# Patient Record
Sex: MALE | Race: WHITE | NOT HISPANIC OR LATINO | Employment: FULL TIME | URBAN - METROPOLITAN AREA
[De-identification: names, ages, dates, MRNs, and addresses within clinical notes are randomized per-mention and may not be internally consistent; named-entity substitution may affect disease eponyms.]

---

## 2018-09-25 ENCOUNTER — TELEPHONE (OUTPATIENT)
Dept: NEUROLOGY | Facility: CLINIC | Age: 48
End: 2018-09-25

## 2018-09-25 NOTE — TELEPHONE ENCOUNTER
----- Message from Romelia Curtis sent at 9/25/2018 10:30 AM CDT -----  Contact: pt   Pt call back to update his info and want to know why his appt was cancel. Pt would like to be worked back in for his appt time. Please call pt at [phone]

## 2018-09-25 NOTE — TELEPHONE ENCOUNTER
PT CALLED TO SAY THAT HE DID NOT CALL ABOUT GETTING ANOTHER APPT. HE IS GOING TO [facility] AND DOES NOT KNOW WHEN HE WILL RETURN. SOMEONE IS CALLING HIM FROM [facility] FOR SOME REASON. 9/25/1825/18/1143/[id number]